# Patient Record
Sex: FEMALE | Race: WHITE | HISPANIC OR LATINO | URBAN - METROPOLITAN AREA
[De-identification: names, ages, dates, MRNs, and addresses within clinical notes are randomized per-mention and may not be internally consistent; named-entity substitution may affect disease eponyms.]

---

## 2019-01-04 ENCOUNTER — EMERGENCY (EMERGENCY)
Facility: HOSPITAL | Age: 39
LOS: 1 days | Discharge: HOME | End: 2019-01-04
Attending: EMERGENCY MEDICINE
Payer: MEDICAID

## 2019-01-04 VITALS
WEIGHT: 207.9 LBS | DIASTOLIC BLOOD PRESSURE: 76 MMHG | HEART RATE: 92 BPM | OXYGEN SATURATION: 100 % | SYSTOLIC BLOOD PRESSURE: 113 MMHG | RESPIRATION RATE: 17 BRPM | TEMPERATURE: 98 F | HEIGHT: 71 IN

## 2019-01-04 VITALS
OXYGEN SATURATION: 100 % | RESPIRATION RATE: 16 BRPM | HEART RATE: 81 BPM | SYSTOLIC BLOOD PRESSURE: 108 MMHG | DIASTOLIC BLOOD PRESSURE: 72 MMHG

## 2019-01-04 LAB
ALBUMIN SERPL ELPH-MCNC: 4.4 G/DL — SIGNIFICANT CHANGE UP (ref 3.3–5)
ALP SERPL-CCNC: 99 U/L — SIGNIFICANT CHANGE UP (ref 40–120)
ALT FLD-CCNC: 24 U/L — SIGNIFICANT CHANGE UP (ref 10–45)
ANION GAP SERPL CALC-SCNC: 14 MMOL/L — SIGNIFICANT CHANGE UP (ref 5–17)
APTT BLD: 24 SEC — LOW (ref 27.5–36.3)
AST SERPL-CCNC: 21 U/L — SIGNIFICANT CHANGE UP (ref 10–40)
BASOPHILS # BLD AUTO: 0 K/UL — SIGNIFICANT CHANGE UP (ref 0–0.2)
BASOPHILS NFR BLD AUTO: 0.2 % — SIGNIFICANT CHANGE UP (ref 0–2)
BILIRUB SERPL-MCNC: 0.4 MG/DL — SIGNIFICANT CHANGE UP (ref 0.2–1.2)
BLD GP AB SCN SERPL QL: NEGATIVE — SIGNIFICANT CHANGE UP
BUN SERPL-MCNC: 14 MG/DL — SIGNIFICANT CHANGE UP (ref 7–23)
CALCIUM SERPL-MCNC: 9.6 MG/DL — SIGNIFICANT CHANGE UP (ref 8.4–10.5)
CHLORIDE SERPL-SCNC: 105 MMOL/L — SIGNIFICANT CHANGE UP (ref 96–108)
CO2 SERPL-SCNC: 20 MMOL/L — LOW (ref 22–31)
CREAT SERPL-MCNC: 0.54 MG/DL — SIGNIFICANT CHANGE UP (ref 0.5–1.3)
EOSINOPHIL # BLD AUTO: 0.1 K/UL — SIGNIFICANT CHANGE UP (ref 0–0.5)
EOSINOPHIL NFR BLD AUTO: 0.7 % — SIGNIFICANT CHANGE UP (ref 0–6)
GLUCOSE SERPL-MCNC: 152 MG/DL — HIGH (ref 70–99)
HCG SERPL-ACNC: 9008 MIU/ML — HIGH
HCT VFR BLD CALC: 33.2 % — LOW (ref 34.5–45)
HGB BLD-MCNC: 11.2 G/DL — LOW (ref 11.5–15.5)
INR BLD: 1.15 RATIO — SIGNIFICANT CHANGE UP (ref 0.88–1.16)
LYMPHOCYTES # BLD AUTO: 1.4 K/UL — SIGNIFICANT CHANGE UP (ref 1–3.3)
LYMPHOCYTES # BLD AUTO: 10.7 % — LOW (ref 13–44)
MCHC RBC-ENTMCNC: 27 PG — SIGNIFICANT CHANGE UP (ref 27–34)
MCHC RBC-ENTMCNC: 33.7 GM/DL — SIGNIFICANT CHANGE UP (ref 32–36)
MCV RBC AUTO: 80 FL — SIGNIFICANT CHANGE UP (ref 80–100)
MONOCYTES # BLD AUTO: 0.5 K/UL — SIGNIFICANT CHANGE UP (ref 0–0.9)
MONOCYTES NFR BLD AUTO: 4.2 % — SIGNIFICANT CHANGE UP (ref 2–14)
NEUTROPHILS # BLD AUTO: 10.6 K/UL — HIGH (ref 1.8–7.4)
NEUTROPHILS NFR BLD AUTO: 84.2 % — HIGH (ref 43–77)
PLATELET # BLD AUTO: 277 K/UL — SIGNIFICANT CHANGE UP (ref 150–400)
POTASSIUM SERPL-MCNC: 4 MMOL/L — SIGNIFICANT CHANGE UP (ref 3.5–5.3)
POTASSIUM SERPL-SCNC: 4 MMOL/L — SIGNIFICANT CHANGE UP (ref 3.5–5.3)
PROT SERPL-MCNC: 8.1 G/DL — SIGNIFICANT CHANGE UP (ref 6–8.3)
PROTHROM AB SERPL-ACNC: 13.3 SEC — HIGH (ref 10–12.9)
RBC # BLD: 4.15 M/UL — SIGNIFICANT CHANGE UP (ref 3.8–5.2)
RBC # FLD: 14.4 % — SIGNIFICANT CHANGE UP (ref 10.3–14.5)
RH IG SCN BLD-IMP: POSITIVE — SIGNIFICANT CHANGE UP
RH IG SCN BLD-IMP: POSITIVE — SIGNIFICANT CHANGE UP
SODIUM SERPL-SCNC: 139 MMOL/L — SIGNIFICANT CHANGE UP (ref 135–145)
WBC # BLD: 12.6 K/UL — HIGH (ref 3.8–10.5)
WBC # FLD AUTO: 12.6 K/UL — HIGH (ref 3.8–10.5)

## 2019-01-04 PROCEDURE — 86901 BLOOD TYPING SEROLOGIC RH(D): CPT

## 2019-01-04 PROCEDURE — 99284 EMERGENCY DEPT VISIT MOD MDM: CPT | Mod: 25

## 2019-01-04 PROCEDURE — 80053 COMPREHEN METABOLIC PANEL: CPT

## 2019-01-04 PROCEDURE — 85027 COMPLETE CBC AUTOMATED: CPT

## 2019-01-04 PROCEDURE — 76817 TRANSVAGINAL US OBSTETRIC: CPT | Mod: 26

## 2019-01-04 PROCEDURE — 99284 EMERGENCY DEPT VISIT MOD MDM: CPT

## 2019-01-04 PROCEDURE — 86900 BLOOD TYPING SEROLOGIC ABO: CPT

## 2019-01-04 PROCEDURE — 76817 TRANSVAGINAL US OBSTETRIC: CPT

## 2019-01-04 PROCEDURE — 85610 PROTHROMBIN TIME: CPT

## 2019-01-04 PROCEDURE — 93975 VASCULAR STUDY: CPT | Mod: 26

## 2019-01-04 PROCEDURE — 84702 CHORIONIC GONADOTROPIN TEST: CPT

## 2019-01-04 PROCEDURE — 85730 THROMBOPLASTIN TIME PARTIAL: CPT

## 2019-01-04 PROCEDURE — 93975 VASCULAR STUDY: CPT

## 2019-01-04 PROCEDURE — 86850 RBC ANTIBODY SCREEN: CPT

## 2019-01-04 RX ORDER — SODIUM CHLORIDE 9 MG/ML
3 INJECTION INTRAMUSCULAR; INTRAVENOUS; SUBCUTANEOUS ONCE
Qty: 0 | Refills: 0 | Status: COMPLETED | OUTPATIENT
Start: 2019-01-04 | End: 2019-01-04

## 2019-01-04 RX ADMIN — SODIUM CHLORIDE 3 MILLILITER(S): 9 INJECTION INTRAMUSCULAR; INTRAVENOUS; SUBCUTANEOUS at 12:35

## 2019-01-04 NOTE — ED PROVIDER NOTE - PROGRESS NOTE DETAILS
pt is [place] speaking, explained that the pregnancy does not seem to be viable and the importance of follow up with OB-gyn to ensure there are not retained products of conception, also explained that there is likely to be vaginal bleeding and return precautions to come back to the hospital if feeling weak, dizzy, ect. Pt and her  at bedside both expressed understanding. Follow up was given  Althea De Leon, PGY-2 EM

## 2019-01-04 NOTE — ED ADULT NURSE NOTE - IN THE PAST YEAR, HOW OFTEN HAVE YOU USED TOBACCO PRODUCTS?
Lainez Catheter Care    A Lainez catheter is a rubber tube that is placed through the urethra (opening where urine comes out) and into the bladder. This helps drain urine from the bladder. There is a small balloon on the end of the tube that is inflated after insertion. This keeps the catheter from sliding out of the bladder.  A Lainez catheter is used to treat urinary retention (unable to pass urine). It is also used when there is incontinence (loss of bladder control).  Home care  · Finish taking any prescribed antibiotic even if you are feeling better before then.  · It is important to keep bacteria from getting into the collection bag. Do not disconnect the catheter from the collection bag.  · Use a leg band to secure the drainage tube, so it does not pull on the catheter. Drain the collection bag when it becomes full using the drain spout at the bottom of the bag.  · Do not try to pull or remove your catheter. This will injure your urethra. It must be removed by your healthcare provider or nurse.  Follow-up care  Follow up with your healthcare provider as advised for repeat urine testing and catheter removal or replacement.  When to seek medical advice  Call your healthcare provider right away if any of these occur:  · Fever of 100.4ºF (38ºC) or higher, or as directed by your healthcare provider  · Bladder pain or fullness  · Abdominal swelling, nausea or vomiting, or back pain  · Blood or urine leakage around the catheter  · Bloody urine coming from the catheter (if a new symptom)  · Catheter falls out  · Catheter stops draining for 6 hours  · Weakness, dizziness, or fainting  Date Last Reviewed: 10/1/2016  © 4664-9465 [employer], [employer]. [address] [place] [address], [place] [address]. All rights reserved. This information is not intended as a substitute for professional medical care. Always follow your healthcare professional's instructions.        
Never

## 2019-01-04 NOTE — ED PROVIDER NOTE - NS ED ROS FT
General: No fevers / chills  HENT: No head trauma, ear pain, runny nose, or sore throat  Eyes: No visual changes  CP: No chest pain, palpitations, or light headedness  Resp: No shortness of breath, no cough  GI: No abdominal pain, diarrhea, constipation, nausea, or vomiting  : +VAGINAL BLEEDING   Neuro: No numbness, tingling, or weakness

## 2019-01-04 NOTE — ED ADULT NURSE NOTE - CHIEF COMPLAINT QUOTE
6 weeks pregnant has been vaginal bleeding x 3 days was seen at [facility] on 1/2 and was told she may be having a miscarriage if bleeding persisted come back to ED.

## 2019-01-04 NOTE — ED ADULT NURSE NOTE - NSIMPLEMENTINTERV_GEN_ALL_ED
Implemented All Universal Safety Interventions:  [place] to call system. Call bell, personal items and telephone within reach. Instruct patient to call for assistance. Room bathroom lighting operational. Non-slip footwear when patient is off stretcher. Physically safe environment: no spills, clutter or unnecessary equipment. Stretcher in lowest position, wheels locked, appropriate side rails in place.

## 2019-01-04 NOTE — ED PROVIDER NOTE - OBJECTIVE STATEMENT
39 yo F pmh htn, dm,  LMP  presents due to vaginal bleeding. Pt reports the bleeding started 2 days ago. Was seen at an outside hospital and had labs, also US, but was not pleased with the care there. Does not know if pregnancy was deemed to be viable or not. Does not have an outside OB-GYN that she is seeing. Pt reports significant bleeding, also with clots. Denies sx including abd pain, n/v/d, dizziness or any other concerns.

## 2019-01-04 NOTE — ED PROVIDER NOTE - NSFOLLOWUPINSTRUCTIONS_ED_ALL_ED_FT
1) Please follow-up with your primary care doctor/OB-GYN in the next 1-2 days.  Please call tomorrow for an appointment.  If you cannot follow-up with your primary care doctor please return to the ED for any urgent issues.  2) You were given a copy of the tests performed today.  Please bring the results with you and review them with your primary care doctor.  3) If you have any worsening of symptoms or any other concerns please return to the ED immediately. This includes chest pain, shortness of breath, fever/chills, increased pain, or any other concerns.  4) Please continue taking your home medications as directed.

## 2019-01-04 NOTE — ED PROVIDER NOTE - ATTENDING CONTRIBUTION TO CARE
37 y/o f with pmhx HTN 6 weeks preg  presents for vaginal bleeding. began approx 2 dsays ago, initially red blood and used one pad yest but today with more blood and clots (still only 1 pad today). she was seen at [facility] and had labs done but not US. labs revealed hgb 11.3 and beta hcg 10,921. desired preg. no fever no chills. +nausea. no abd pain but mild cramps, feels like her  menses. no back pain. no urinary symptoms. has not yet seen an ob  Gen.  no acute distress  HEENT:  perrl eomi   Lungs:  b/l bs  CVS: S1S2   Abd;  soft, non tender. + bs  Ext: no pitting edema  Neuro:aaox3 no focal deficits  MSK: 5/5 x 4 ext

## 2019-01-04 NOTE — ED ADULT TRIAGE NOTE - CHIEF COMPLAINT QUOTE
6 weeks pregnant has been vaginal bleeding x 3 days was seen at [facility] on 1/2 and was told she may be having a miscarriage if bleeding persisted come back to ED.

## 2019-01-04 NOTE — ED ADULT NURSE NOTE - OBJECTIVE STATEMENT
0725 38 yr old HF brought to ER by  for further eval and tx of vaginal bleeding and lower abd cramps. Pt is 6 wks pregnant. Was evaluated at [place] [facility] 2 days ago for same symptoms. A&Ox4. ambulatory. Skin W&D. Color pink. 4/10 suprapubic, lower abd pain bilat, abd cramps. Denies N/V, dizziness or back pain. Para 0  1

## 2019-01-07 ENCOUNTER — APPOINTMENT (OUTPATIENT)
Dept: OBGYN | Facility: CLINIC | Age: 39
End: 2019-01-07

## 2019-01-08 ENCOUNTER — LABORATORY RESULT (OUTPATIENT)
Age: 39
End: 2019-01-08

## 2019-01-08 PROBLEM — Z34.90 ENCOUNTER FOR SUPERVISION OF NORMAL PREGNANCY, UNSPECIFIED, UNSPECIFIED TRIMESTER: Chronic | Status: ACTIVE | Noted: 2019-01-04

## 2019-01-09 ENCOUNTER — APPOINTMENT (OUTPATIENT)
Dept: OBGYN | Facility: CLINIC | Age: 39
End: 2019-01-09
Payer: MEDICAID

## 2019-01-09 ENCOUNTER — OUTPATIENT (OUTPATIENT)
Dept: OUTPATIENT SERVICES | Facility: HOSPITAL | Age: 39
LOS: 1 days | End: 2019-01-09
Payer: MEDICAID

## 2019-01-09 VITALS
WEIGHT: 213 LBS | HEIGHT: 67 IN | DIASTOLIC BLOOD PRESSURE: 80 MMHG | SYSTOLIC BLOOD PRESSURE: 118 MMHG | BODY MASS INDEX: 33.43 KG/M2

## 2019-01-09 DIAGNOSIS — Z86.39 PERSONAL HISTORY OF OTHER ENDOCRINE, NUTRITIONAL AND METABOLIC DISEASE: ICD-10-CM

## 2019-01-09 DIAGNOSIS — Z82.49 FAMILY HISTORY OF ISCHEMIC HEART DISEASE AND OTHER DISEASES OF THE CIRCULATORY SYSTEM: ICD-10-CM

## 2019-01-09 DIAGNOSIS — I10 ESSENTIAL (PRIMARY) HYPERTENSION: ICD-10-CM

## 2019-01-09 DIAGNOSIS — Z00.00 ENCOUNTER FOR GENERAL ADULT MEDICAL EXAMINATION W/OUT ABNORMAL FINDINGS: ICD-10-CM

## 2019-01-09 DIAGNOSIS — N76.0 ACUTE VAGINITIS: ICD-10-CM

## 2019-01-09 DIAGNOSIS — Z83.3 FAMILY HISTORY OF DIABETES MELLITUS: ICD-10-CM

## 2019-01-09 PROCEDURE — 99203 OFFICE O/P NEW LOW 30 MIN: CPT | Mod: NC

## 2019-01-09 PROCEDURE — 36415 COLL VENOUS BLD VENIPUNCTURE: CPT | Mod: NC

## 2019-01-09 NOTE — PROCEDURE
[Cervical Pap Smear] : cervical Pap smear [GC Chlamydia Culture] : GC Chlamydia Culture [Tolerated Well] : the patient tolerated the procedure well [No Complications] : there were no complications [Intrauterine Pregnancy] : no intrauterine pregnancy [Yolk Sac] : no yolk sac [Fetal Heart] : no fetal heart [FreeTextEntry1] : no evidence of Gest sac in uterus

## 2019-01-09 NOTE — HISTORY OF PRESENT ILLNESS
[Definite:  ___ (Date)] : the last menstrual period was [unfilled] [Normal Amount/Duration] : was of a normal amount and duration [Spotting Between  Menses] : no spotting between menses [Regular Cycle Intervals] : periods have been regular [Frequency: Q ___ days] : menstrual periods occur approximately every [unfilled] days [Menarche Age: ____] : age at menarche was [unfilled] [Menstrual Cramps] : menstrual cramps [Sexually Active] : is sexually active [Monogamous] : is monogamous [Age of First Sexual [place] ___] : became sexually active at the age of [unfilled] [Contraception] : does not use contraception [Male ___] : [unfilled] male

## 2019-01-09 NOTE — PHYSICAL EXAM
[Awake] : awake [Alert] : alert [Acute Distress] : no acute distress [LAD] : no lymphadenopathy [Thyroid Nodule] : no thyroid nodule [Goiter] : no goiter [Mass] : no breast mass [Nipple Discharge] : no nipple discharge [Axillary LAD] : no axillary lymphadenopathy [Soft] : soft [Tender] : non tender [Oriented x3] : oriented to person, place, and time [No Lesions] : no genitalia lesions [Labia Majora] : labia major [Labia Minora] : labia minora [Normal] : clitoris [No Bleeding] : there was no active vaginal bleeding [Discharge] : had no discharge [Pap Obtained] : a Pap smear was performed [Motion Tenderness] : there was no cervical motion tenderness [Uterine Adnexae] : were not tender and not enlarged [No Tenderness] : no rectal tenderness [RRR, No Murmurs] : RRR, no murmurs [CTAB] : CTAB

## 2019-01-10 LAB — HCG SERPL-ACNC: 780 MIU/ML — HIGH

## 2019-01-15 DIAGNOSIS — I10 ESSENTIAL (PRIMARY) HYPERTENSION: ICD-10-CM

## 2019-01-15 DIAGNOSIS — O03.9 COMPLETE OR UNSPECIFIED SPONTANEOUS ABORTION WITHOUT COMPLICATION: ICD-10-CM

## 2019-01-23 ENCOUNTER — MESSAGE (OUTPATIENT)
Age: 39
End: 2019-01-23

## 2019-01-23 ENCOUNTER — LABORATORY RESULT (OUTPATIENT)
Age: 39
End: 2019-01-23

## 2019-01-23 DIAGNOSIS — O03.9 COMPLETE OR UNSPECIFIED SPONTANEOUS ABORTION W/OUT COMPLICATION: ICD-10-CM

## 2019-01-23 PROCEDURE — 84702 CHORIONIC GONADOTROPIN TEST: CPT

## 2019-01-23 PROCEDURE — G0463: CPT

## 2019-01-23 PROCEDURE — 36415 COLL VENOUS BLD VENIPUNCTURE: CPT

## 2019-01-24 LAB — HCG SERPL-ACNC: 13 MIU/ML — HIGH

## 2019-05-08 ENCOUNTER — LABORATORY RESULT (OUTPATIENT)
Age: 39
End: 2019-05-08

## 2019-05-09 ENCOUNTER — RESULT CHARGE (OUTPATIENT)
Age: 39
End: 2019-05-09

## 2019-05-09 ENCOUNTER — OUTPATIENT (OUTPATIENT)
Dept: OUTPATIENT SERVICES | Facility: HOSPITAL | Age: 39
LOS: 1 days | End: 2019-05-09
Payer: MEDICAID

## 2019-05-09 ENCOUNTER — APPOINTMENT (OUTPATIENT)
Dept: OBGYN | Facility: CLINIC | Age: 39
End: 2019-05-09
Payer: MEDICAID

## 2019-05-09 VITALS — DIASTOLIC BLOOD PRESSURE: 86 MMHG | WEIGHT: 221 LBS | SYSTOLIC BLOOD PRESSURE: 140 MMHG | BODY MASS INDEX: 34.61 KG/M2

## 2019-05-09 DIAGNOSIS — R10.2 PELVIC AND PERINEAL PAIN: ICD-10-CM

## 2019-05-09 DIAGNOSIS — N76.0 ACUTE VAGINITIS: ICD-10-CM

## 2019-05-09 PROCEDURE — 99213 OFFICE O/P EST LOW 20 MIN: CPT | Mod: NC

## 2019-05-09 PROCEDURE — 87491 CHLMYD TRACH DNA AMP PROBE: CPT

## 2019-05-09 PROCEDURE — 87591 N.GONORRHOEAE DNA AMP PROB: CPT

## 2019-05-09 PROCEDURE — G0463: CPT

## 2019-05-10 LAB
C TRACH RRNA SPEC QL NAA+PROBE: SIGNIFICANT CHANGE UP
N GONORRHOEA RRNA SPEC QL NAA+PROBE: SIGNIFICANT CHANGE UP
SPECIMEN SOURCE: SIGNIFICANT CHANGE UP

## 2019-05-10 NOTE — PHYSICAL EXAM
[Soft] : soft [Normal] : uterus [Tender] : non tender [Discharge] : had no discharge [Distended] : not distended [Ovarian Mass (___ Cm)] : there were no adnexal masses [Mass ___ cm] : no uterine mass was palpated [FreeTextEntry7] : + mild diffuse tenderness all lower abdomen with deep palpation

## 2019-05-16 DIAGNOSIS — R10.2 PELVIC AND PERINEAL PAIN: ICD-10-CM

## 2019-05-21 ENCOUNTER — FORM ENCOUNTER (OUTPATIENT)
Age: 39
End: 2019-05-21

## 2019-05-22 ENCOUNTER — APPOINTMENT (OUTPATIENT)
Dept: ULTRASOUND IMAGING | Facility: HOSPITAL | Age: 39
End: 2019-05-22

## 2019-05-22 ENCOUNTER — OUTPATIENT (OUTPATIENT)
Dept: OUTPATIENT SERVICES | Facility: HOSPITAL | Age: 39
LOS: 1 days | End: 2019-05-22
Payer: MEDICAID

## 2019-05-22 DIAGNOSIS — R10.2 PELVIC AND PERINEAL PAIN: ICD-10-CM

## 2019-05-22 PROCEDURE — 93975 VASCULAR STUDY: CPT

## 2019-05-22 PROCEDURE — 76830 TRANSVAGINAL US NON-OB: CPT | Mod: 26

## 2019-05-22 PROCEDURE — 76856 US EXAM PELVIC COMPLETE: CPT | Mod: 26

## 2019-05-22 PROCEDURE — 76830 TRANSVAGINAL US NON-OB: CPT

## 2019-05-22 PROCEDURE — 76856 US EXAM PELVIC COMPLETE: CPT

## 2019-05-22 PROCEDURE — 93976 VASCULAR STUDY: CPT

## 2019-05-22 PROCEDURE — 93976 VASCULAR STUDY: CPT | Mod: 26,59

## 2019-07-31 LAB
BILIRUB UR QL STRIP: NORMAL
GLUCOSE UR-MCNC: NORMAL
HCG UR QL: 0.2 EU/DL
HCG UR QL: NEGATIVE
HGB UR QL STRIP.AUTO: NORMAL
KETONES UR-MCNC: NORMAL
LEUKOCYTE ESTERASE UR QL STRIP: NORMAL
NITRITE UR QL STRIP: NORMAL
PH UR STRIP: 5.5
PROT UR STRIP-MCNC: NORMAL
SP GR UR STRIP: 1.01

## 2019-09-09 ENCOUNTER — EMERGENCY (EMERGENCY)
Facility: HOSPITAL | Age: 39
LOS: 1 days | Discharge: HOME | End: 2019-09-09
Attending: EMERGENCY MEDICINE
Payer: COMMERCIAL

## 2019-09-09 VITALS
HEART RATE: 75 BPM | OXYGEN SATURATION: 100 % | DIASTOLIC BLOOD PRESSURE: 95 MMHG | RESPIRATION RATE: 16 BRPM | SYSTOLIC BLOOD PRESSURE: 172 MMHG | TEMPERATURE: 98 F

## 2019-09-09 VITALS
HEART RATE: 74 BPM | SYSTOLIC BLOOD PRESSURE: 146 MMHG | OXYGEN SATURATION: 99 % | DIASTOLIC BLOOD PRESSURE: 89 MMHG | RESPIRATION RATE: 16 BRPM | TEMPERATURE: 99 F

## 2019-09-09 LAB
APPEARANCE UR: CLEAR — SIGNIFICANT CHANGE UP
BACTERIA # UR AUTO: NEGATIVE — SIGNIFICANT CHANGE UP
BILIRUB UR-MCNC: NEGATIVE — SIGNIFICANT CHANGE UP
COLOR SPEC: YELLOW — SIGNIFICANT CHANGE UP
DIFF PNL FLD: ABNORMAL
EPI CELLS # UR: 1 /HPF — SIGNIFICANT CHANGE UP
GLUCOSE UR QL: NEGATIVE — SIGNIFICANT CHANGE UP
HCG UR QL: NEGATIVE — SIGNIFICANT CHANGE UP
HYALINE CASTS # UR AUTO: 1 /LPF — SIGNIFICANT CHANGE UP (ref 0–2)
KETONES UR-MCNC: NEGATIVE — SIGNIFICANT CHANGE UP
LEUKOCYTE ESTERASE UR-ACNC: NEGATIVE — SIGNIFICANT CHANGE UP
NITRITE UR-MCNC: NEGATIVE — SIGNIFICANT CHANGE UP
PH UR: 6 — SIGNIFICANT CHANGE UP (ref 5–8)
PROT UR-MCNC: ABNORMAL
RBC CASTS # UR COMP ASSIST: 8 /HPF — HIGH (ref 0–4)
SP GR SPEC: 1.01 — SIGNIFICANT CHANGE UP (ref 1.01–1.02)
UROBILINOGEN FLD QL: NEGATIVE — SIGNIFICANT CHANGE UP
WBC UR QL: 2 /HPF — SIGNIFICANT CHANGE UP (ref 0–5)

## 2019-09-09 PROCEDURE — 99283 EMERGENCY DEPT VISIT LOW MDM: CPT

## 2019-09-09 PROCEDURE — 81025 URINE PREGNANCY TEST: CPT

## 2019-09-09 PROCEDURE — 99282 EMERGENCY DEPT VISIT SF MDM: CPT

## 2019-09-09 PROCEDURE — 81001 URINALYSIS AUTO W/SCOPE: CPT

## 2019-09-09 PROCEDURE — 87086 URINE CULTURE/COLONY COUNT: CPT

## 2019-09-09 NOTE — ED PROVIDER NOTE - CLINICAL SUMMARY MEDICAL DECISION MAKING FREE TEXT BOX
see MD note see MD note    *pt speaks Niuean (some english),  translating, additional translation services used

## 2019-09-09 NOTE — ED ADULT NURSE NOTE - NSIMPLEMENTINTERV_GEN_ALL_ED
Implemented All Universal Safety Interventions:  [place] to call system. Call bell, personal items and telephone within reach. Instruct patient to call for assistance. Room bathroom lighting operational. Non-slip footwear when patient is off stretcher. Physically safe environment: no spills, clutter or unnecessary equipment. Stretcher in lowest position, wheels locked, appropriate side rails in place.

## 2019-09-09 NOTE — ED PROVIDER NOTE - PATIENT PORTAL LINK FT
You can access the FollowMyHealth Patient Portal offered by [facility] by registering at the following website: [email or web address]. By joining [employer]’s FollowMyHealth portal, you will also be able to view your health information using other applications (apps) compatible with our system.

## 2019-09-09 NOTE — ED ADULT NURSE NOTE - OBJECTIVE STATEMENT
39 y/o female seen in [place]/ Peds ER c/o  dysuria and urinary frequency x1 week.  Pt reports taking cranberry and a urinary analgesic with some relief.  Pt reports has never had a UTI before.  Pt denies fevers, chills,  abd pain, n/v/d, hematuria, vaginal discharge, SOB, chest pain.  Respiration easy and non labored.  Extremities mobile.  No acute respiratory distress noted.

## 2019-09-09 NOTE — ED PROVIDER NOTE - PHYSICAL EXAMINATION
GENERAL: Patient awake alert NAD.  LUNGS: CTAB, no wheezes or crackles.   CARDIAC: RRR, no m/r/g.    ABDOMEN: Soft, NT, ND, No rebound, guarding. No CVA tenderness.   : Bimanual exam done with SOFIA Gonzalez.  No adnexal tenderness, no masses palpated.  EXT: No edema. No calf tenderness. CV 2+DP/PT bilaterally.   MSK: No pain with movement, no deformities.  NEURO: A&Ox3. Moving all extremities.  SKIN: Warm and dry. No rash.  PSYCH: Normal affect.

## 2019-09-09 NOTE — ED PROVIDER NOTE - OBJECTIVE STATEMENT
Pt. is a 38 y.o. F w/ PMHx of DM p/w dysuria and urinary frequency since 1 week.  Pt. attempted to get an appointment w/ PCP but was unable to do so.  She's in the ED because of her medical hx of dm and the dysuria going on for a week, pt. was worried.  She took otc cranberry and a urinary analgesic w/ some relief.  Pt. has never had a UTI before.  Pt. is sexually active w/ her  and has no hx of STIs.  Pt. was pregnant once this past december but lost the baby in January.  She has not been pregnant before or after that.  Pt. denies any fevers, chills, n/v/d, abdominal tenderness, hematuria, vaginal discharge, hematochezia, melena, headache, CP, SOB.

## 2019-09-09 NOTE — ED PROVIDER NOTE - ATTENDING CONTRIBUTION TO CARE
------------ATTENDING NOTE------------  pt w/  c/o several days of mild pressure/burning w/ urination, some increased frequency, no fevers, no nausea/vomiting, no anorexia, no change in BM/stools, no h/o UTI or ureteral stones, overall benign abd exam w/o tenderness or guarding/rebound or mass/organomegaly, nml VS, tolerating PO, benign bimanual exam, will hold off antibiotic pending UCx as UA wnl and no fevers/systemic symptoms, nml VS at dc, no additional complaints, in depth dw all about ddx, tx, madrigal, continued close outpt fu.  - Jc Gibbs MD   -----------------------------------------------

## 2019-09-09 NOTE — ED PROVIDER NOTE - NS ED ROS FT
General: denies fever, chills, weight loss/weight gain.  HENT: denies nasal congestion, sore throat, rhinorrhea, ear pain  Eyes: denies visual changes, blurred vision, eye discharge, eye redness  Neck: denies neck pain, neck swelling  CV: denies chest pain, palpitations  Resp: denies difficulty breathing, cough  Abdominal: denies nausea, vomiting, diarrhea, abdominal pain, blood in stool, dark stool  : Pt. denies any vaginal discharge or bleed  MSK: denies muscle aches, bony pain, leg pain, leg swelling  Neuro: denies headaches, numbness, tingling, dizziness, lightheadedness.  Skin: denies rashes, cuts, bruises  Hematologic: denies unexplained bruises

## 2019-09-09 NOTE — ED PROVIDER NOTE - NSFOLLOWUPINSTRUCTIONS_ED_ALL_ED_FT
See your Primary Doctor this week for follow up -- call to discuss.    Stay well hydrated, eat regular healthy meals.    Follow up URINE CULTURE -- you will be called with abnormal results.    See DYSURIA information and return instructions given to you.    Seek immediate medical care for new/worsening symptoms/concerns.

## 2019-09-10 LAB
CULTURE RESULTS: SIGNIFICANT CHANGE UP
SPECIMEN SOURCE: SIGNIFICANT CHANGE UP

## 2019-10-10 ENCOUNTER — APPOINTMENT (OUTPATIENT)
Dept: OBGYN | Facility: CLINIC | Age: 39
End: 2019-10-10

## 2019-12-17 NOTE — ED ADULT NURSE NOTE - CAS EDP DISCH TYPE
This is a recent snapshot of the patient's [facility] medical record.  For current drug dose and complete information and questions, call [phone] or In [facility] pool, fv home infusion ([id number])  CSN Number:  [id number]       Home

## 2021-10-06 PROBLEM — I10 ESSENTIAL HYPERTENSION: Status: ACTIVE | Noted: 2019-01-09

## 2024-08-15 NOTE — ED ADULT TRIAGE NOTE - BSA (M2)
Patient resting at this time. No signs or symptoms of distress. Respirations even and unlabored. Sitter at bedside. Safety precautions in place.       Lavinia Fairchild RN  08/15/24 6355     2.14

## 2024-12-19 NOTE — ED ADULT NURSE NOTE - NSFALLRSKINDICATORS_ED_ALL_ED
EMERGENCY DEPARTMENT ENCOUNTER      NAME: Clya Hamilton  AGE: 49 year old male  YOB: 1975  MRN: [id number]  EVALUATION DATE & TIME: 12/19/2024 12:42 AM    PCP: No primary care provider on file.    ED PROVIDER: Sulaiman Ozuna M.D.      Chief Complaint   Patient presents with    Tachycardia         FINAL IMPRESSION:  No diagnosis found.      ED COURSE & MEDICAL DECISION MAKING:    Pertinent Labs & Imaging studies reviewed. (See chart for details)  49 year old male presents to the Emergency Department for evaluation of palpitations.  Patient onset of palpitations for working out.  Was found to be SVT when he got here.  Converted on his own.  Had some chest pain shortness of breath with his SVT.  Electrolytes normal.  Did consider PE.  Dimer elevated.  Did get a CT scan which shows no PE.  Slightly elevated creatinine.  May be dehydrated.  Given IV fluids here.  Did trend troponins and they did trend up.  Did have some ST depression when he is in a rapid rate.  Concern for possible some degree of cardiac disease.  Given his trending troponin will admit for further care.  Discussed with hospitalist accepted patient    12:44 AM I met with the patient to gather history and to perform my initial exam. I discussed the plan for care while in the Emergency Department.       At the conclusion of the encounter I discussed the results of all of the tests and the disposition. The questions were answered. The patient or family acknowledged understanding and was agreeable with the care plan.     Medical Decision Making  Obtained supplemental history:Supplemental history obtained?: No  Reviewed external records: External records reviewed?: No  Care impacted by chronic illness:Documented in Chart  Did you consider but not order tests?: Work up considered but not performed and documented in chart, if applicable  Did you interpret images independently?: Independent interpretation of ECG and images noted in  documentation, when applicable.  Consultation discussion with other provider:Did you involve another provider (consultant, , pharmacy, etc.)?: I discussed the care with another health care provider, see documentation for details.  Admit.    MIPS: CT Pulmonary Angiogram:The patient had an abnormal d-dimer.      ID    MEDICATIONS GIVEN IN THE EMERGENCY:  Medications   sodium chloride 0.9% BOLUS 1,000 mL (has no administration in time range)       NEW PRESCRIPTIONS STARTED AT TODAY'S ER VISIT  New Prescriptions    No medications on file          =================================================================    HPI    Patient information was obtained from: Patient       Clay Hamilton is a 49 year old male with a pertinent history of anxiety who presents to this ED for evaluation of palpitations and heart racing.  Patient states he was running today.  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  He is been running in his full helmet and gear.  Was doing sprints.  During his sprints he noticed his heart rate got up into the 190s.  With this he felt worsening palpitations and shortness of breath.  No chest pain.  No nausea or vomiting.  Denies any leg pain or swelling.  Symptoms actually resolved by the time I saw him.  Did recently travel to [place] by car.  Did have multiple sick contacts.        PAST MEDICAL HISTORY:  No past medical history on file.    PAST SURGICAL HISTORY:  Past Surgical History:   Procedure Laterality Date    APPENDECTOMY      SKIN GRAFT      [facility] APPENDECTOMY      Description: Appendectomy;  Recorded: 07/05/2009;           CURRENT MEDICATIONS:    Current Facility-Administered Medications   Medication Dose Route Frequency Provider Last Rate Last Admin    sodium chloride 0.9% BOLUS 1,000 mL  1,000 mL Intravenous Once Sulaiman Ozuna MD         No current outpatient medications on file.         ALLERGIES:  No Known Allergies    FAMILY HISTORY:  Family History   Problem  "Relation Age of Onset    No Known Problems Mother     No Known Problems Father     No Known Problems Daughter     No Known Problems Son        SOCIAL HISTORY:   Social History     Socioeconomic History    Marital status: Single   Tobacco Use    Smoking status: Former    Smokeless tobacco: Never   Substance and Sexual Activity    Alcohol use: Yes     Alcohol/week: 3.0 standard drinks of alcohol     Comment: Alcoholic Drinks/day: oc    Drug use: No    Sexual activity: Yes     Partners: Female       VITALS:  /84   Pulse (!) 162   Temp 98.6  F (37  C) (Temporal)   Resp 20   Ht 1.727 m (5' 8\")   Wt 85.3 kg (188 lb)   SpO2 96%   BMI 28.59 kg/m      PHYSICAL EXAM    Physical Exam  Vitals and nursing note reviewed.   Constitutional:       General: He is not in acute distress.     Appearance: He is not diaphoretic.   HENT:      Head: Atraumatic.   Eyes:      General: No scleral icterus.     Pupils: Pupils are equal, round, and reactive to light.   Cardiovascular:      Rate and Rhythm: Normal rate and regular rhythm.      Heart sounds: Normal heart sounds.   Pulmonary:      Effort: No respiratory distress.      Breath sounds: Normal breath sounds.   Abdominal:      Palpations: Abdomen is soft.      Tenderness: There is no abdominal tenderness.   Musculoskeletal:         General: No tenderness.   Lymphadenopathy:      Cervical: No cervical adenopathy.   Skin:     General: Skin is warm.      Findings: No rash.           LAB:  All pertinent labs reviewed and interpreted.  Labs Ordered and Resulted from Time of ED Arrival to Time of ED Departure - No data to display    RADIOLOGY:  Reviewed all pertinent imaging. Please see official radiology report.  No orders to display       EKG:    Performed at: 0038  Impression: SVT with a rate of 139.  Nonspecific ST changes.  When compared to previous dated January 22, 2019 now in SVT with some nonspecific rate related changes.  SVT with a rate of 139.  QRS 72.  QTc " 447    Performed at: 0057  Impression: Sinus rhythm with no acute ischemic findings.  When compared to previous earlier tonight no longer in SVT.  No longer T wave changes  Sinus rhythm rate 77.  .  QRS 78.  QTc 405  I have independently reviewed and interpreted the EKG(s) documented above.        Sulaiman Ozuna M.D.  Emergency Medicine  [facility] EMERGENCY ROOM  [facility] [facility] [id number] [phone]  Dept: [phone]       Sulaiman Ozuna MD  12/19/24 0605     no

## 2025-01-11 NOTE — ED PROVIDER NOTE - PHYSICAL EXAMINATION
Gen: NAD, non-toxic, conversational  Eyes: PERRLA, EOMI   HENT: Normocephalic, atraumatic. External ears normal, no rhinorrhea, moist mucous membranes.   CV: RRR, no M/R/G  Resp: CTAB, non-labored, speaking without difficulty on room air  Abd: soft, non tender, non rigid, no guarding or rebound tenderness  Skin: dry, wwp
within normal limits

## 2025-02-19 NOTE — ED ADULT NURSE NOTE - DISCHARGE DATE/TIME
SW assisted patient with completing a Power of  for Health Care document this date.     Power Of  designee/healthcare agent - Not activated:  Name:  Toby Rutherford   Relationship: 1. Brother   Phone number:  [phone]    A copy was placed in patients chart and medical records bin for scanning.  
04-Jan-2019 12:34